# Patient Record
Sex: FEMALE | Race: WHITE | Employment: OTHER | URBAN - METROPOLITAN AREA
[De-identification: names, ages, dates, MRNs, and addresses within clinical notes are randomized per-mention and may not be internally consistent; named-entity substitution may affect disease eponyms.]

---

## 2017-05-17 ENCOUNTER — CARE COORDINATION (OUTPATIENT)
Dept: CASE MANAGEMENT | Facility: CLINIC | Age: 32
End: 2017-05-17

## 2017-10-29 ENCOUNTER — HEALTH MAINTENANCE LETTER (OUTPATIENT)
Age: 32
End: 2017-10-29

## 2018-04-16 ENCOUNTER — TELEPHONE (OUTPATIENT)
Dept: MATERNAL FETAL MEDICINE | Facility: CLINIC | Age: 33
End: 2018-04-16

## 2018-04-16 NOTE — TELEPHONE ENCOUNTER
4/16/2018    Called Genna to discuss her recent referral to [facility] for genetic counseling.  Genna has had two previous pregnancies affected with diaphragmatic hernia, with no specific diagnosis or cause established.  The couple had 1 son from a twin pregnancy who was born with a CDH and passed away from complications of his CDH.  Their daughter is almost 5 years old and has had multiple surgeries to correct her CDH and per report is otherwise doing well.  Genna reports that she and her  are contemplating trying for another pregnancy in the future, but would like to talk about potential recurrence risks for them based on their history and any potential new information available before they begin trying to conceive.  Previously the couple had been counseled on a possible diagnosis of Fryns syndrome for their affected children, although this was not a clearly established diagnosis for either of their children who had CDH.  Our schedulers will contact Genna to arrange this appointment at her convenience.      Lencho Haider MS, [facility]  Licensed Genetic Counselor  Phone: [phone]  Pager: [phone]

## 2018-06-11 ENCOUNTER — TRANSFERRED RECORDS (OUTPATIENT)
Dept: MATERNAL FETAL MEDICINE | Facility: CLINIC | Age: 33
End: 2018-06-11

## 2018-06-25 ENCOUNTER — PRE VISIT (OUTPATIENT)
Dept: MATERNAL FETAL MEDICINE | Facility: CLINIC | Age: 33
End: 2018-06-25

## 2019-10-01 ENCOUNTER — TRANSFERRED RECORDS (OUTPATIENT)
Dept: HEALTH INFORMATION MANAGEMENT | Facility: CLINIC | Age: 34
End: 2019-10-01

## 2019-10-03 ENCOUNTER — TRANSCRIBE ORDERS (OUTPATIENT)
Dept: MATERNAL FETAL MEDICINE | Facility: CLINIC | Age: 34
End: 2019-10-03

## 2019-10-03 ENCOUNTER — MEDICAL CORRESPONDENCE (OUTPATIENT)
Dept: HEALTH INFORMATION MANAGEMENT | Facility: CLINIC | Age: 34
End: 2019-10-03

## 2019-10-03 DIAGNOSIS — Z31.69 ENCOUNTER FOR PRECONCEPTION CONSULTATION: Primary | ICD-10-CM

## 2019-10-14 ENCOUNTER — OFFICE VISIT (OUTPATIENT)
Dept: MATERNAL FETAL MEDICINE | Facility: CLINIC | Age: 34
End: 2019-10-14
Attending: OBSTETRICS & GYNECOLOGY
Payer: COMMERCIAL

## 2019-10-14 ENCOUNTER — PRE VISIT (OUTPATIENT)
Dept: MATERNAL FETAL MEDICINE | Facility: CLINIC | Age: 34
End: 2019-10-14

## 2019-10-14 DIAGNOSIS — Z31.69 ENCOUNTER FOR PRECONCEPTION CONSULTATION: Primary | ICD-10-CM

## 2019-10-14 DIAGNOSIS — Z87.760 HISTORY OF CONGENITAL DIAPHRAGMATIC HERNIA: ICD-10-CM

## 2019-10-14 PROCEDURE — 96040 ZZH GENETIC COUNSELING, EACH 30 MINUTES: CPT | Mod: ZF | Performed by: GENETIC COUNSELOR, MS

## 2019-10-14 NOTE — PROGRESS NOTES
"[facility]  Genetic Counseling Consult    Patient:  Genna Haywood YOB: 1985   Date of Service:  10/14/19      Genna Haywood was seen at the [facility] for genetic consultation today. She was accompanied to today's visit by her partner, Arturo.       Impression/Plan:   Genna has had two prior pregnancies, both with complications. See pregnancy history. She has had two children with congenital diaphragmatic hernias and one unaffected child. Genna and Arturo report consideration of another pregnancy, however, they desire information regarding recurrence risks prior to making this decision. See Risk Assessment for complete discussion. We reviewed the possible 10-25% chance of another baby with a congenital diaphragmatic hernia given their history.     One of their children had a \"borderline\" cystic fibrosis result at birth that was never able to be clarified. It is not known if one or both of them are carriers for CF. This screening could be pursued if desired.    Pregnancy History:   /Parity:    Current Age: 34 year old    Genna pereira pregnancy history is significant for:  o 10/2010: 28+5,  di/di twins, male (Ravinder)/male (Dmitri)  - Di/di twin pregnancy. Comprehensive ultrasound was performed at 19w3d and identified a congenital diaphragmatic hernia (CDH) for twin B (Dmitri). Amniocentesis was performed with normal chromosome analysis (46,XY) and normal FISH for 12p (Pallister-Cristiano). Delivery at 28w5d. Dmitri passed away at 3 months and 3 weeks of age. Autopsy showed congenital diaphragmatic hernia, hypospadius, and imperforate anus. No genetic testing was completed after birth due to multiple blood transfusions.   o 2014: 38+4, vaginal delivery, female (Marianne)  - Comprehensive ultrasound at 21w6d identified a congenital diaphragmatic hernia. Normal non-invasive prenatal screen " (NIPT) result prenatally. Delivery at 38+4. Normal microarray after delivery. Reherniation 2 years ago with repeat surgery. Overall doing well with no concerns for developmental delay.          Risk Assessment for CDH:   Congenital diaphragmatic hernias (CDH) result from an abnormal muscularization or insufficient formation of the diaphragm. Abnormal formation of the diaphram can cause the abdominal contents to move into the chest cavity. This can result in pulmonary hypoplasia and displacement of the heart.    Fifty to 60% of CDH cases are isolated congenital anomalies while the remaining 40-50% are seen in combination with other congenital anomalies. Although CDH associated with other congenital anomalies are more likely to have an underlying genetic cause, both isolated and non-isolated CDH can be sporadic or associated with autosomal recessive, autosomal dominant, or X-linked inheritance patterns. Greater than 80% of CDH cases have no known, identifiable, underlying genetic cause, however, more than 50 different genetic causes have been associated with CDH. The most common genetic abnormalities associated with CDH include trisomy 18, isochromosome 12p (Pallister-Cristiano syndrome), duplication of chromosome 22, and deletions on chromosomes 1, 4p16 8 (Adams-Hirschhorn syndrome), 8p23.1, and 15q26.2. A heritable condition known to be associated with CDH that does not allow for molecular testing is Fryns syndrome.     Dmitri had a normal chromosome analysis and FISH for 12p, ruling out a diagnosis of trisomy 18 and Pallister-Flensburg syndrome. Marianne had a normal microarray, ruling out deletions and duplications large enough to be detected.     Fryns syndrome was strongly considered after the birth and passing of Dmitri given his CDH and associated anomalies. This diagnosis was further considered when Marianne's CDH was diagnosed prenatally given the positive family history. Unfortunately, the genetic cause of  "Fryns syndrome remains unknown, therefore it cannot be confirmed nor ruled out through molecular testing. A clinical diagnosis or suspicion can be made based on an agreed upon list of \"suggestive findings\" including CDH, characteristic facial appearance, distal digital hypoplasia, pulmonary hypoplasia, characteristic associated anomalies (polyhydramnios, cloudy corneas, orofacial clefting, brain or cardiovascular malformations, renal dysplasia, gastrointestinal malformation, genital malformation), and affected siblings.     Based on review of available medical records and Cristian's recollection, Dmitri had hypospadius and imperforate anus along with CDH, however, no further characteristics associated with Fryns syndrome. Genna and Arturo understood after that pregnancy that there could be up to a 25% chance of Fryns in a future pregnancy if Fryns was the correct diagnosis. Multifactorial inheritance estimated a recurrence risk of 2%. Marianne was indeed born with a congenital diaphragmatic hernia, however, she did not have any associated anomalies and is doing well at age 4. There are few individuals with a diagnosis of Fryns that have survived into childhood. Of those reported individuals, they have all had significant intellectual disability and developmental delays. Marianne does not have any reported delays, making Fryns syndrome a very unlikely diagnosis for her. We reviewed the possibility that Dmitri had CDH associated with Fryns syndrome while Marianne had CDH due to a different cause.      Although three siblings with CDH has not been reported, multifactorial inheritance estimates a 10% recurrence risk of CDH if a couple has had two previously affected children. Cristian understand the limitations in these statistical numbers. They also had further questions regarding ultrasound recommendations for future pregnancies given their history. They could wish to complete a nuchal " "translucency ultrasound, an early anatomy ultrasound (~15-16 weeks), and a comprehensive ultrasound (~18-21 weeks).     Family History:   A three-generation pedigree was not obtained today as it has been obtained previously. Unfortunately, this pedigree is not available for review today. Based on medical records, Genna's sister reportedly has had 10+ first trimester pregnancy losses with no cause identified.        Carrier Screening:   As part of the [place]  Screen, Dmitri's level of immunoreactive trypsinogen (IRT) was tested after birth. His elevated IRT level \"flagged\" him for further testing for cystic fibrosis. He was found to have one mutation (delta F508) on a 39 gene panel and had a borderline sweat test result on 2010. No further testing was able to be completed before Dmitri's passing, therefore, it remains unknown if Dmitri was solely a carrier for cystic fibrosis or if he had cystic fibrosis.     Cystic Fibrosis Inheritance  Cystic fibrosis (CF) is inherited in an autosomal recessive pattern, meaning that a child must inherit a mutation in the CFTR gene from both their mother and father in order to have cystic fibrosis.  Dmitri inherited one known mutation, which indicates that he was at least a carrier of CF.  The  screen is not able to look for all mutations known to be associated with CF, so it is not known if he may have had a second mutation that was not detected by the screen. Genna and Arturo have not completed carrier screening for CF. If they were both found to be carriers for CF, they would have a 25% chance of having a child with CF. Although reassuring that Ravinder Lopes have CF, it does not rule out the possibility that Cristian are both carriers.     We reviewed the availability of carrier screening if they so desired. This carrier screening would not give any further information regarding recurrence risk for CDH, however, could clarify the " chance of having a child with CF. This screening could be performed by MFM or her OBGYN provider.      It was a pleasure to be involved with Genna s care. Face-to-face time of the meeting was 30 minutes.      Christiana Cochran MS, [facility]  Maternal Fetal Medicine  [facility]  Ph: [phone]  [email or web address]